# Patient Record
Sex: FEMALE | Race: WHITE | URBAN - METROPOLITAN AREA
[De-identification: names, ages, dates, MRNs, and addresses within clinical notes are randomized per-mention and may not be internally consistent; named-entity substitution may affect disease eponyms.]

---

## 2017-01-13 ENCOUNTER — HISTORICAL (OUTPATIENT)
Dept: RADIOLOGY | Facility: HOSPITAL | Age: 47
End: 2017-01-13

## 2017-01-18 ENCOUNTER — HISTORICAL (OUTPATIENT)
Dept: RADIATION THERAPY | Facility: HOSPITAL | Age: 47
End: 2017-01-18

## 2017-02-02 ENCOUNTER — HISTORICAL (OUTPATIENT)
Dept: SPEECH THERAPY | Facility: HOSPITAL | Age: 47
End: 2017-02-02

## 2017-02-03 ENCOUNTER — HISTORICAL (OUTPATIENT)
Dept: INFUSION THERAPY | Facility: HOSPITAL | Age: 47
End: 2017-02-03

## 2017-03-01 ENCOUNTER — HISTORICAL (OUTPATIENT)
Dept: SPEECH THERAPY | Facility: HOSPITAL | Age: 47
End: 2017-03-01

## 2017-03-06 ENCOUNTER — HISTORICAL (OUTPATIENT)
Dept: RADIATION THERAPY | Facility: HOSPITAL | Age: 47
End: 2017-03-06

## 2017-03-09 ENCOUNTER — HISTORICAL (OUTPATIENT)
Dept: RADIOLOGY | Facility: HOSPITAL | Age: 47
End: 2017-03-09

## 2017-03-13 ENCOUNTER — HISTORICAL (OUTPATIENT)
Dept: RADIATION THERAPY | Facility: HOSPITAL | Age: 47
End: 2017-03-13

## 2017-03-23 ENCOUNTER — HISTORICAL (OUTPATIENT)
Dept: ENDOSCOPY | Facility: HOSPITAL | Age: 47
End: 2017-03-23

## 2017-05-15 ENCOUNTER — HISTORICAL (OUTPATIENT)
Dept: RADIATION THERAPY | Facility: HOSPITAL | Age: 47
End: 2017-05-15

## 2017-06-19 ENCOUNTER — HISTORICAL (OUTPATIENT)
Dept: RADIATION THERAPY | Facility: HOSPITAL | Age: 47
End: 2017-06-19

## 2017-07-12 ENCOUNTER — HISTORICAL (OUTPATIENT)
Dept: RADIOLOGY | Facility: HOSPITAL | Age: 47
End: 2017-07-12

## 2017-07-24 ENCOUNTER — HISTORICAL (OUTPATIENT)
Dept: ADMINISTRATIVE | Facility: HOSPITAL | Age: 47
End: 2017-07-24

## 2017-07-24 ENCOUNTER — HISTORICAL (OUTPATIENT)
Dept: RADIATION THERAPY | Facility: HOSPITAL | Age: 47
End: 2017-07-24

## 2017-07-24 LAB
ABS NEUT (OLG): 9.24 X10(3)/MCL (ref 2.1–9.2)
ALBUMIN SERPL-MCNC: 3.2 GM/DL (ref 3.4–5)
ALBUMIN/GLOB SERPL: 0.8 RATIO (ref 1.1–2)
ALP SERPL-CCNC: 86 UNIT/L (ref 38–126)
ALT SERPL-CCNC: 28 UNIT/L (ref 12–78)
AST SERPL-CCNC: 20 UNIT/L (ref 15–37)
BASOPHILS # BLD AUTO: 0 X10(3)/MCL (ref 0–0.2)
BASOPHILS NFR BLD AUTO: 0.1 %
BILIRUB SERPL-MCNC: 0.7 MG/DL (ref 0.2–1)
BILIRUBIN DIRECT+TOT PNL SERPL-MCNC: 0.1 MG/DL (ref 0–0.5)
BILIRUBIN DIRECT+TOT PNL SERPL-MCNC: 0.6 MG/DL (ref 0–0.8)
BUN SERPL-MCNC: 16 MG/DL (ref 7–18)
CALCIUM SERPL-MCNC: 10.4 MG/DL (ref 8.5–10.1)
CHLORIDE SERPL-SCNC: 100 MMOL/L (ref 98–107)
CO2 SERPL-SCNC: 28 MMOL/L (ref 21–32)
CREAT SERPL-MCNC: 0.99 MG/DL (ref 0.55–1.02)
EOSINOPHIL # BLD AUTO: 0.1 X10(3)/MCL (ref 0–0.9)
EOSINOPHIL NFR BLD AUTO: 0.5 %
ERYTHROCYTE [DISTWIDTH] IN BLOOD BY AUTOMATED COUNT: 13.6 % (ref 11.5–17)
GLOBULIN SER-MCNC: 4 GM/DL (ref 2.4–3.5)
GLUCOSE SERPL-MCNC: 123 MG/DL (ref 74–106)
HCT VFR BLD AUTO: 36.6 % (ref 37–47)
HGB BLD-MCNC: 11.8 GM/DL (ref 12–16)
LYMPHOCYTES # BLD AUTO: 0.7 X10(3)/MCL (ref 0.6–4.6)
LYMPHOCYTES NFR BLD AUTO: 6.2 %
MCH RBC QN AUTO: 33.1 PG (ref 27–31)
MCHC RBC AUTO-ENTMCNC: 32.2 GM/DL (ref 33–36)
MCV RBC AUTO: 102.8 FL (ref 80–94)
MONOCYTES # BLD AUTO: 1 X10(3)/MCL (ref 0.1–1.3)
MONOCYTES NFR BLD AUTO: 8.9 %
NEUTROPHILS # BLD AUTO: 9.2 X10(3)/MCL (ref 2.1–9.2)
NEUTROPHILS NFR BLD AUTO: 84.3 %
PLATELET # BLD AUTO: 278 X10(3)/MCL (ref 130–400)
PMV BLD AUTO: 9 FL (ref 9.4–12.4)
POTASSIUM SERPL-SCNC: 4 MMOL/L (ref 3.5–5.1)
PROT SERPL-MCNC: 7.2 GM/DL (ref 6.4–8.2)
RBC # BLD AUTO: 3.56 X10(6)/MCL (ref 4.2–5.4)
SODIUM SERPL-SCNC: 140 MMOL/L (ref 136–145)
VIT B12 SERPL-MCNC: 779 PG/ML (ref 193–986)
WBC # SPEC AUTO: 11 X10(3)/MCL (ref 4.5–11.5)

## 2017-07-27 ENCOUNTER — HISTORICAL (OUTPATIENT)
Dept: INFUSION THERAPY | Facility: HOSPITAL | Age: 47
End: 2017-07-27

## 2017-08-02 ENCOUNTER — HISTORICAL (OUTPATIENT)
Dept: INFUSION THERAPY | Facility: HOSPITAL | Age: 47
End: 2017-08-02

## 2017-08-02 LAB
ABS NEUT (OLG): 6.01 X10(3)/MCL (ref 2.1–9.2)
ALBUMIN SERPL-MCNC: 3.3 GM/DL (ref 3.4–5)
ALBUMIN/GLOB SERPL: 0.8 RATIO (ref 1.1–2)
ALP SERPL-CCNC: 107 UNIT/L (ref 38–126)
ALT SERPL-CCNC: 32 UNIT/L (ref 12–78)
AST SERPL-CCNC: 22 UNIT/L (ref 15–37)
BILIRUB SERPL-MCNC: 0.4 MG/DL (ref 0.2–1)
BILIRUBIN DIRECT+TOT PNL SERPL-MCNC: 0.1 MG/DL (ref 0–0.5)
BILIRUBIN DIRECT+TOT PNL SERPL-MCNC: 0.3 MG/DL (ref 0–0.8)
BUN SERPL-MCNC: 21 MG/DL (ref 7–18)
CALCIUM SERPL-MCNC: 9.5 MG/DL (ref 8.5–10.1)
CHLORIDE SERPL-SCNC: 99 MMOL/L (ref 98–107)
CO2 SERPL-SCNC: 30 MMOL/L (ref 21–32)
CREAT SERPL-MCNC: 0.59 MG/DL (ref 0.55–1.02)
EOSINOPHIL # BLD AUTO: 0 X10(3)/MCL (ref 0–0.9)
EOSINOPHIL NFR BLD AUTO: 0.1 %
ERYTHROCYTE [DISTWIDTH] IN BLOOD BY AUTOMATED COUNT: 13.8 % (ref 11.5–17)
GLOBULIN SER-MCNC: 3.9 GM/DL (ref 2.4–3.5)
GLUCOSE SERPL-MCNC: 123 MG/DL (ref 74–106)
HCT VFR BLD AUTO: 49.4 % (ref 37–47)
HGB BLD-MCNC: 15.9 GM/DL (ref 12–16)
LYMPHOCYTES # BLD AUTO: 0.4 X10(3)/MCL (ref 0.6–4.6)
LYMPHOCYTES NFR BLD AUTO: 5.3 %
MCH RBC QN AUTO: 32.9 PG (ref 27–31)
MCHC RBC AUTO-ENTMCNC: 32.2 GM/DL (ref 33–36)
MCV RBC AUTO: 102.3 FL (ref 80–94)
MONOCYTES # BLD AUTO: 0.5 X10(3)/MCL (ref 0.1–1.3)
MONOCYTES NFR BLD AUTO: 6.9 %
NEUTROPHILS # BLD AUTO: 6 X10(3)/MCL (ref 2.1–9.2)
NEUTROPHILS NFR BLD AUTO: 87.7 %
PLATELET # BLD AUTO: 163 X10(3)/MCL (ref 130–400)
PMV BLD AUTO: 10.5 FL (ref 9.4–12.4)
POTASSIUM SERPL-SCNC: 4.4 MMOL/L (ref 3.5–5.1)
PROT SERPL-MCNC: 7.2 GM/DL (ref 6.4–8.2)
RBC # BLD AUTO: 4.83 X10(6)/MCL (ref 4.2–5.4)
SODIUM SERPL-SCNC: 137 MMOL/L (ref 136–145)
WBC # SPEC AUTO: 6.8 X10(3)/MCL (ref 4.5–11.5)

## 2017-08-09 ENCOUNTER — HISTORICAL (OUTPATIENT)
Dept: HEMATOLOGY/ONCOLOGY | Facility: CLINIC | Age: 47
End: 2017-08-09

## 2017-08-09 LAB
ABS NEUT (OLG): 12.39 X10(3)/MCL (ref 2.1–9.2)
ALBUMIN SERPL-MCNC: 3.3 GM/DL (ref 3.4–5)
ALBUMIN/GLOB SERPL: 0.9 {RATIO}
ALP SERPL-CCNC: 108 UNIT/L (ref 38–126)
ALT SERPL-CCNC: 26 UNIT/L (ref 12–78)
AST SERPL-CCNC: 20 UNIT/L (ref 15–37)
BASOPHILS # BLD AUTO: 0 X10(3)/MCL (ref 0–0.2)
BASOPHILS NFR BLD AUTO: 0.1 %
BILIRUB SERPL-MCNC: 0.3 MG/DL (ref 0.2–1)
BILIRUBIN DIRECT+TOT PNL SERPL-MCNC: 0.1 MG/DL (ref 0–0.2)
BILIRUBIN DIRECT+TOT PNL SERPL-MCNC: 0.2 MG/DL (ref 0–0.8)
BUN SERPL-MCNC: 14 MG/DL (ref 7–18)
CALCIUM SERPL-MCNC: 10.3 MG/DL (ref 8.5–10.1)
CHLORIDE SERPL-SCNC: 97 MMOL/L (ref 98–107)
CO2 SERPL-SCNC: 35 MMOL/L (ref 21–32)
CREAT SERPL-MCNC: 0.58 MG/DL (ref 0.55–1.02)
EOSINOPHIL # BLD AUTO: 0 X10(3)/MCL (ref 0–0.9)
EOSINOPHIL NFR BLD AUTO: 0.1 %
ERYTHROCYTE [DISTWIDTH] IN BLOOD BY AUTOMATED COUNT: 14.5 % (ref 11.5–17)
GLOBULIN SER-MCNC: 3.5 GM/DL (ref 2.4–3.5)
GLUCOSE SERPL-MCNC: 95 MG/DL (ref 74–106)
HCT VFR BLD AUTO: 35.7 % (ref 37–47)
HGB BLD-MCNC: 11.4 GM/DL (ref 12–16)
LYMPHOCYTES # BLD AUTO: 0.7 X10(3)/MCL (ref 0.6–4.6)
LYMPHOCYTES NFR BLD AUTO: 5.1 %
MCH RBC QN AUTO: 33.3 PG (ref 27–31)
MCHC RBC AUTO-ENTMCNC: 31.9 GM/DL (ref 33–36)
MCV RBC AUTO: 104.4 FL (ref 80–94)
MONOCYTES # BLD AUTO: 1.2 X10(3)/MCL (ref 0.1–1.3)
MONOCYTES NFR BLD AUTO: 8.7 %
NEUTROPHILS # BLD AUTO: 12.4 X10(3)/MCL (ref 2.1–9.2)
NEUTROPHILS NFR BLD AUTO: 86 %
PLATELET # BLD AUTO: 228 X10(3)/MCL (ref 130–400)
PMV BLD AUTO: 9.7 FL (ref 9.4–12.4)
POTASSIUM SERPL-SCNC: 4 MMOL/L (ref 3.5–5.1)
PROT SERPL-MCNC: 6.8 GM/DL (ref 6.4–8.2)
RBC # BLD AUTO: 3.42 X10(6)/MCL (ref 4.2–5.4)
SODIUM SERPL-SCNC: 139 MMOL/L (ref 136–145)
WBC # SPEC AUTO: 14.4 X10(3)/MCL (ref 4.5–11.5)

## 2017-08-23 ENCOUNTER — HISTORICAL (OUTPATIENT)
Dept: RADIATION THERAPY | Facility: HOSPITAL | Age: 47
End: 2017-08-23

## 2017-08-23 ENCOUNTER — HISTORICAL (OUTPATIENT)
Dept: INFUSION THERAPY | Facility: HOSPITAL | Age: 47
End: 2017-08-23

## 2017-08-23 LAB
ABS NEUT (OLG): 14.57 X10(3)/MCL (ref 2.1–9.2)
ALBUMIN SERPL-MCNC: 3 GM/DL (ref 3.4–5)
ALP SERPL-CCNC: 79 UNIT/L (ref 38–126)
ALT SERPL-CCNC: 17 UNIT/L (ref 12–78)
ANION GAP SERPL CALC-SCNC: 13 MMOL/L
AST SERPL-CCNC: 16 UNIT/L (ref 15–37)
BASOPHILS # BLD AUTO: 0 X10(3)/MCL (ref 0–0.2)
BASOPHILS NFR BLD AUTO: 0.1 %
BILIRUB SERPL-MCNC: 0.2 MG/DL (ref 0.2–1)
BILIRUBIN DIRECT+TOT PNL SERPL-MCNC: 0.1 MG/DL (ref 0–0.5)
BILIRUBIN DIRECT+TOT PNL SERPL-MCNC: 0.1 MG/DL (ref 0–0.8)
BUN SERPL-MCNC: 12 MG/DL (ref 7–18)
CHLORIDE SERPL-SCNC: 93 MMOL/L (ref 98–109)
CREAT SERPL-MCNC: 0.7 MG/DL (ref 0.6–1.3)
CROSSMATCH INTERPRETATION: NORMAL
EOSINOPHIL # BLD AUTO: 0.1 X10(3)/MCL (ref 0–0.9)
EOSINOPHIL NFR BLD AUTO: 0.5 %
ERYTHROCYTE [DISTWIDTH] IN BLOOD BY AUTOMATED COUNT: 15 % (ref 11.5–17)
GLUCOSE SERPL-MCNC: 115 MG/DL (ref 70–105)
GROUP & RH: NORMAL
HCT VFR BLD AUTO: 27.2 % (ref 37–47)
HCT VFR BLD CALC: 25 % (ref 38–51)
HGB BLD-MCNC: 8.5 MG/DL (ref 12–17)
HGB BLD-MCNC: 8.7 GM/DL (ref 12–16)
LIVER PROFILE INTERP: ABNORMAL
LYMPHOCYTES # BLD AUTO: 0.8 X10(3)/MCL (ref 0.6–4.6)
LYMPHOCYTES NFR BLD AUTO: 4.9 %
MCH RBC QN AUTO: 33.6 PG (ref 27–31)
MCHC RBC AUTO-ENTMCNC: 32 GM/DL (ref 33–36)
MCV RBC AUTO: 105 FL (ref 80–94)
MONOCYTES # BLD AUTO: 1.4 X10(3)/MCL (ref 0.1–1.3)
MONOCYTES NFR BLD AUTO: 8.5 %
NEUTROPHILS # BLD AUTO: 14.6 X10(3)/MCL (ref 2.1–9.2)
NEUTROPHILS NFR BLD AUTO: 86 %
PLATELET # BLD AUTO: 319 X10(3)/MCL (ref 130–400)
PMV BLD AUTO: 8.9 FL (ref 9.4–12.4)
POC IONIZED CALCIUM: 1.38 MMOL/L (ref 1.12–1.32)
POC TCO2: 32 MMOL/L (ref 22–27)
POTASSIUM BLD-SCNC: 3.5 MMOL/L (ref 3.5–4.9)
PRODUCT READY: NORMAL
PROT SERPL-MCNC: 6.7 GM/DL (ref 6.4–8.2)
RBC # BLD AUTO: 2.59 X10(6)/MCL (ref 4.2–5.4)
SODIUM BLD-SCNC: 134 MMOL/L (ref 138–146)
TRANSFUSION ORDER: NORMAL
WBC # SPEC AUTO: 16.9 X10(3)/MCL (ref 4.5–11.5)

## 2017-08-24 ENCOUNTER — HISTORICAL (OUTPATIENT)
Dept: RADIATION THERAPY | Facility: HOSPITAL | Age: 47
End: 2017-08-24

## 2017-08-25 ENCOUNTER — HISTORICAL (OUTPATIENT)
Dept: RADIATION THERAPY | Facility: HOSPITAL | Age: 47
End: 2017-08-25

## 2017-08-28 ENCOUNTER — HISTORICAL (OUTPATIENT)
Dept: RADIATION THERAPY | Facility: HOSPITAL | Age: 47
End: 2017-08-28

## 2017-08-29 ENCOUNTER — HISTORICAL (OUTPATIENT)
Dept: RADIATION THERAPY | Facility: HOSPITAL | Age: 47
End: 2017-08-29

## 2017-08-30 ENCOUNTER — HISTORICAL (OUTPATIENT)
Dept: RADIATION THERAPY | Facility: HOSPITAL | Age: 47
End: 2017-08-30

## 2017-08-31 ENCOUNTER — HISTORICAL (OUTPATIENT)
Dept: RADIATION THERAPY | Facility: HOSPITAL | Age: 47
End: 2017-08-31

## 2017-09-01 ENCOUNTER — HISTORICAL (OUTPATIENT)
Dept: RADIATION THERAPY | Facility: HOSPITAL | Age: 47
End: 2017-09-01

## 2017-09-05 ENCOUNTER — HISTORICAL (OUTPATIENT)
Dept: RADIATION THERAPY | Facility: HOSPITAL | Age: 47
End: 2017-09-05

## 2017-09-06 ENCOUNTER — HISTORICAL (OUTPATIENT)
Dept: RADIATION THERAPY | Facility: HOSPITAL | Age: 47
End: 2017-09-06

## 2017-09-07 ENCOUNTER — HISTORICAL (OUTPATIENT)
Dept: RADIATION THERAPY | Facility: HOSPITAL | Age: 47
End: 2017-09-07

## 2017-09-08 ENCOUNTER — HISTORICAL (OUTPATIENT)
Dept: RADIATION THERAPY | Facility: HOSPITAL | Age: 47
End: 2017-09-08

## 2017-09-11 ENCOUNTER — HISTORICAL (OUTPATIENT)
Dept: ADMINISTRATIVE | Facility: HOSPITAL | Age: 47
End: 2017-09-11

## 2017-09-11 ENCOUNTER — HISTORICAL (OUTPATIENT)
Dept: RADIATION THERAPY | Facility: HOSPITAL | Age: 47
End: 2017-09-11

## 2017-09-11 LAB
ABS NEUT (OLG): 8.24 X10(3)/MCL (ref 2.1–9.2)
ALBUMIN SERPL-MCNC: 2.7 GM/DL (ref 3.4–5)
ALBUMIN/GLOB SERPL: 0.7 RATIO (ref 1.1–2)
ALP SERPL-CCNC: 69 UNIT/L (ref 38–126)
ALT SERPL-CCNC: 31 UNIT/L (ref 12–78)
AST SERPL-CCNC: 17 UNIT/L (ref 15–37)
BASOPHILS # BLD AUTO: 0 X10(3)/MCL (ref 0–0.2)
BASOPHILS NFR BLD AUTO: 0.1 %
BILIRUB SERPL-MCNC: 0.3 MG/DL (ref 0.2–1)
BILIRUBIN DIRECT+TOT PNL SERPL-MCNC: 0.1 MG/DL (ref 0–0.5)
BILIRUBIN DIRECT+TOT PNL SERPL-MCNC: 0.2 MG/DL (ref 0–0.8)
BUN SERPL-MCNC: 16 MG/DL (ref 7–18)
CALCIUM SERPL-MCNC: 8.9 MG/DL (ref 8.5–10.1)
CHLORIDE SERPL-SCNC: 97 MMOL/L (ref 98–107)
CO2 SERPL-SCNC: 31 MMOL/L (ref 21–32)
CREAT SERPL-MCNC: 0.56 MG/DL (ref 0.55–1.02)
EOSINOPHIL # BLD AUTO: 0 X10(3)/MCL (ref 0–0.9)
EOSINOPHIL NFR BLD AUTO: 0.3 %
ERYTHROCYTE [DISTWIDTH] IN BLOOD BY AUTOMATED COUNT: 15 % (ref 11.5–17)
GLOBULIN SER-MCNC: 4.1 GM/DL (ref 2.4–3.5)
GLUCOSE SERPL-MCNC: 105 MG/DL (ref 74–106)
HCT VFR BLD AUTO: 29.1 % (ref 37–47)
HGB BLD-MCNC: 8.9 GM/DL (ref 12–16)
LYMPHOCYTES # BLD AUTO: 0.7 X10(3)/MCL (ref 0.6–4.6)
LYMPHOCYTES NFR BLD AUTO: 6.9 %
MCH RBC QN AUTO: 32.2 PG (ref 27–31)
MCHC RBC AUTO-ENTMCNC: 30.6 GM/DL (ref 33–36)
MCV RBC AUTO: 105.4 FL (ref 80–94)
MONOCYTES # BLD AUTO: 1 X10(3)/MCL (ref 0.1–1.3)
MONOCYTES NFR BLD AUTO: 9.6 %
NEUTROPHILS # BLD AUTO: 8.2 X10(3)/MCL (ref 2.1–9.2)
NEUTROPHILS NFR BLD AUTO: 83.1 %
PLATELET # BLD AUTO: 385 X10(3)/MCL (ref 130–400)
PMV BLD AUTO: 9 FL (ref 9.4–12.4)
POTASSIUM SERPL-SCNC: 4.4 MMOL/L (ref 3.5–5.1)
PROT SERPL-MCNC: 6.8 GM/DL (ref 6.4–8.2)
RBC # BLD AUTO: 2.76 X10(6)/MCL (ref 4.2–5.4)
SODIUM SERPL-SCNC: 137 MMOL/L (ref 136–145)
WBC # SPEC AUTO: 9.9 X10(3)/MCL (ref 4.5–11.5)

## 2022-04-30 NOTE — PROGRESS NOTES
Patient:   Salome Malone            MRN: [id number]            FIN: [id number]               Age:   47 years     Sex:  Female     :  1970   Associated Diagnoses:   Malignant neoplasm of overlapping sites of tongue; Malnutrition of moderate degree; Major depressive disorder, single episode, moderate; Anemia; Dehydration   Author:   Donnie Naylor      Visit Information      ENT: Dr. Baljit Bhat  Radiation Oncologist: Dr. Sheriff Prellop    Recurrence--Bilateral neck 2017  Recurrent Tongue Cancer s/p salvage surgery--17  Tongue Cancer stage LEONEL([id number]) diagnosed 16  Biopsy/surgery/pathology:   1. Tongue mass biopsy done 16--invasive, keratinizing squamous cell carcinoma, well-differentiated. p16 negative.  2. Salvage total glossectomy, partial palatectomy, neck dissection, right thigh musculocutaneous free flap done 17--0.2cm focus on lateral border of tongue residual disease squamous cell carcinoma, margins clear, treatment effect present, lympho-vascular invasion not identified, perineural invasion present, 38 lymph nodes involved with focus 1.5cm, extranodal extension present. (T1N1)  3. Right neck mass FNA/core needle biopsy done 17--positive for metastatic squamous cell carcinoma.  Imagin. CT neck w/ contrast 16--large mass occupying right half of tongue measures 6.2X2.4X2.3cm with metastatic adenopathy in neck measuring up to 1.7cm  2. PET/CT 16--right half of tongue muscles c/w biopsy proven tongue carcinoma, involves 4.6cm anterior posterior diameter and 3.3cm of width with SUV 11.18, tongue mass crosses the midline. bilateral parapharyngeal spaces/submandibular hypermetabolic enlarged lymph nodes, left aspect measures 2.5X2.0cm with SUV 11.42 and right measures 1.8X1.4cm with SUV 8.5; No other sytemic disease.  3. PET/CT 17--Significant interval improvement in disease with minial residual activity along right aspect of  tongue, may be post-therapeutic, no hypermetabolic enlarged lymph nodes currently identified, mildly asymmetric activity along right true vocal cord with is minimally medially deviated compared to left, may relate to post-therapeutic change.  4. CT soft tissue neck 3/9/17--right tongue heterogeneity persists but improved, difficult to discretely measure, right level 2A necrotic adenopathy has increased in size, otherwise no jugulodigastric adenopathy.  5. PET/CT 7/12/17--Interval development of hypermetabolic right cervical matted adenopathy measures 5.5X3cm with SUV 28.9, extending to tracheostomy site and up to the level of the hyoid bone.    Procedures:  PEG placed by Dr. Bin Abreu 6/9/16--removed 1/20/17.  Complete dental extractions done 6/16/16.  Right IJ mediport placed by Dr. Sam 6/22/16.  PEG replaced Dr. Bin Abreu on 3/23/17.    Treatment history:    1. Induction chemothearpy TPF X 3 cycles 6/27/16--8/9/16   2. Weekly Cisplatin with XRT 8/29/16--10/17/16 (received only 4 weekly doses of Cisplatin due to thrombocytopenia).   3. Salvage surgery Dr. Bhat 4/5/17.    Treatment plan:    1. Palliative Carboplatin/Paclitaxel every 3 weeks. Started on 7/27/17.    Due for Cycle # 2 on 8/17/17.       Visit type:  Scheduled follow-up.    Accompanied by:  Spouse.       Chief Complaint   8/2/2017 10:20 CDT       No c/o        Interval History   Current complaint: Patient presents for follow-up. She started her 1st cycle of palliative Carbo/Taxol last week.  is at bedside. Reports that her neck wounds are not any worse since starting treatment but they are not better either. She tolerated chemotherapy fairly well with exception of nausea and loose stools. No vomiting and no BMs so far today. He gave her a dose of Imodium yesterday and this appears to have helped. He states she had some aches an pains over the weekend from Neulasta but this has also resolved. Continues to tolerate her tube feedings  without any problems. Noted 1 lb weight loss in the past week. Hospice came and spoke with the patient earlier this week and the patient admits she is not ready yet for Hospice. She wants to do 2 more cycles of chemotherapy before making decision. Her  mentions that her central neck wound continues to be packed daily and this causes irration and bleeding when he has to change the dressing. No difficulty removing or inserting her trach at this time but he is concerned that he will have problems in the future. Patient appears dehydrated today so will plan to give IV fluids today and have her HH give on Friday. No other problems reported.      Review of Systems   Constitutional:  Fatigue, weight stable, No fever, No chills, No decreased activity, No loss of appetite.    Eye:  No recent visual problem.    Ear/Nose/Mouth/Throat:  +neck swelling, No decreased hearing, No nasal congestion.    Respiratory:  No shortness of breath, No cough, No wheezing.    Cardiovascular:  No chest pain, No palpitations, No peripheral edema.    Gastrointestinal:  Nausea, Diarrhea, +NPO, all support with PEG, Diarrhea x 2 days. Resolved with Imodium, No vomiting, No constipation, No heartburn, No abdominal pain.    Hematology/Lymphatics:  No bruising tendency, No bleeding tendency.    Immunologic   Musculoskeletal:  No back pain, No joint pain.    Integumentary:  +wounds along neck recurrence of her cancer, No rash, No skin lesion.    Neurologic:  Alert and oriented X4, No confusion, No headache.    Psychiatric:  Depression (better), No anxiety.    All other systems are negative      Health Status   Allergies:    Allergic Reactions (Selected)  No Known Medication Allergies   Current medications:  (Selected)   Documented Medications  Documented  Hydrocodon-Acetamin 7.5-325/15: 15 mL, Oral, q6hr  Tylenol Childrens 160 mg/5 mL oral suspension: 640 mg = 20 mL, Oral, q4hr, PRN PRN pain, mild, 0 Refill(s)  Vitamin B Complex oral liquid: 10  mL, PEG Tube, Daily, 0 Refill(s)  fluoxetine 20 mg oral capsule: 20 mg = 1 cap(s), Oral, Daily, # 30 cap(s), 0 Refill(s)  glutamine oral powder for reconstitution: 20 gms, PEG Tube, BID, 0 Refill(s)  loratadine 5 mg/5 mL oral syrup: See Instructions, 10 mL per PEG Daily for 5 days after chemotherapy, 0 Refill(s)  ondansetron 4 mg/5 mL oral solution: See Instructions, 10 mL per PEG TID for 3 days after chemotherapy and every 8hrs PRN nausea, # 200 mL, 6 Refill(s)      Histories   Past Medical History:    No active or resolved past medical history items have been selected or recorded.   Family History:    Father  Alzheimer's disease  Mother  Pacemaker...  Sister    History is negative.     Procedure history:    PEG Tube Insertion Initial on 3/23/2017 at 46 Years.  Comments:  3/23/2017 14:27 - Hamlet Mejia RN  auto-populated from documented surgical case  Catheter Insertion Mediport (None) on 2016 at 46 Years.  Comments:  2016 15:18 - Nicole Pacheco  auto-populated from documented surgical case  PEG Tube Insertion Initial on 2016 at 46 Years.  Comments:  6/10/2016 08:47 - Dominic DENNIS, Maru DIAZ  auto-populated from documented surgical case   delivery only; ([id number]).  teeth extractions.   Social History        Alcohol  Comment: Denies alcohol use.    Employment/School  Description:   Comment: last worked 3 weeks ago    Home/Environment  Lives with: Spouse    Nutrition/Health  Uses alternative healthcare: peg tube    Substance Abuse   Use: Never    Tobacco  Use: Former smoker  Type: Cigarettes  Type: Cigarettes  Comment: Quit in 2016.        Physical Examination   Vital Signs   2017 10:20 CDT       Temperature Oral          36.8 DegC                             Peripheral Pulse Rate     125 bpm  HI                             SpO2                      98 %                             Systolic Blood Pressure   105 mmHg                             Diastolic Blood Pressure  79  mmHg     General:  Alert and oriented, No acute distress, thin, chronically ill-appearing white female.    Eye:  Pupils are equal, round and reactive to light, Vision unchanged.    HENT:  Normocephalic, Normal hearing, s/p total glossectomy.    Neck:  Supple, No jugular venous distention, No thyromegaly, +massive necrotic adenopathy R>L surrounding tracheostomy with few places with open wounds, mild serous/bloody drainage on gauze. Packing in place to wound central wound with bloody drainage.    Respiratory:  Lungs are clear to auscultation, Respirations are non-labored, Breath sounds are equal.    Cardiovascular:  Normal rate, Regular rhythm, No murmur, No gallop, Normal peripheral perfusion, No edema.    Gastrointestinal:  Soft, Non-tender, Non-distended, Normal bowel sounds, No organomegaly, PEG in place on left--clean/dry.    Lymphatics:  No lymphadenopathy neck, axilla, groin.    Musculoskeletal:  Normal range of motion, Normal strength, No deformity, Normal gait.    Integumentary:  Warm, Dry, Intact.    Neurologic:  Alert, Oriented, Normal sensory, Normal motor function, Normal gait.    Psychiatric:  Cooperative, Appropriate mood & affect.    Cognition and Speech:  Oriented, Speech clear and coherent.    ECOG Performance Scale: 1- Strenuous physical activity restricted; fully ambulatory and able to carry out light work.      Review / Management   Results review:  Lab results   8/2/2017 10:14 CDT       WBC                       6.8 x10(3)/mcL                             RBC                       4.83 x10(6)/mcL                             Hgb                       15.9 gm/dL                             Hct                       49.4 %  HI                             Platelet                  163 x10(3)/mcL                             MCV                       102.3 fL  HI                             MCH                       32.9 pg  HI                             MCHC                      32.2 gm/dL  LOW                              RDW                       13.8 %                             MPV                       10.5 fL                             Abs Neut                  6.01 x10(3)/mcL                             NEUT%                     87.7 %  NA                             NEUT#                     6.0 x10(3)/mcL                             LYMPH%                    5.3 %  NA                             LYMPH#                    0.4 x10(3)/mcL  LOW                             MONO%                     6.9 %  NA                             MONO#                     0.5 x10(3)/mcL                             EOS%                      0.1 %  NA                             EOS#                      0.0 x10(3)/mcL  .       Impression and Plan   Diagnosis     Malignant neoplasm of overlapping sites of tongue (RLZ09-NF C02.8).     Malnutrition of moderate degree (ECE67-LB E44.0).     Major depressive disorder, single episode, moderate (BFG36-YE F32.1).     Anemia (FEN51-PR D64.9).     Dehydration (RSD91-VW E86.0).     Plan:  Patient with advanced head and neck cancer, stage LEONEL tongue cancer extending to base of tongue with bilateral neck adenopathy, diagnosed 5/23/16.  Patient received induction chemotherapy X 3 cycles followed by definitive chemotherapy and radiation completed 10/17/16.  PET from 1/13/17 showed resolution of hypermetabolic neck adenopathy, some mild residual activity right tongue and mild activity vocal cords which direct visualization is needed.  Patient developed recurrence in neck and tongue and underwent total glossectomy/bilateral neck dissection 4/5/17 which revealed residual disease T1N1 with extranodal extension.  She did well until 7/2017 when she developed increasing neck swelling thought to be abscess/infection but needle biopsy now confirms recurrent disease.  PET shows disease limited to neck/paratracheal.    Patient is not a candidate for triplet therapy with cisplatin/5FU/Erbitux due  to her overall poor condition.  Dr. Welch chose to treat her with palliative Carboplatin and paclitaxel.Patient and her  understand that her cancer is very aggressive and may not respond to treatment. They also understand that treatment is only palliative and that she could be at risk for worsening of neck wounds associated with her cancer.   Patient started her 1st cycle on 7/27/17. Tolerated fairly well.   Labs today show resolved anemia but I feel this is due to dehydration. WBC normal. CMP is pending. Monitoring calcium level closely. If increasing, will add Zometa.   Plan to give 1 liter of Normal saline today.   Will contact her HH - [facility] and schedule additional IV fluids on 8/4/17.   Labs only next week.   RTC for labs and treatment only - Cycle # 2 on 8/17/17. Neulasta patch ordered.   Follow-up in 3 weeks with labs.   Weight stable today. Tolerating tube feedings.   Continue with Imodium as needed for loose stools.   Continue with current wound care to neck wounds. Instructed patient's  to take a picture of wound today so we can have something to compare to when she comes for her next appointment.   Hospice evaluated patient earlier this week but she is not quite ready to stop chemotherapy treatments.   All questions answered.     Patient was told to contact me with any problems before return to clinic.      ANKITA Michael

## 2022-04-30 NOTE — PROGRESS NOTES
Patient:   Salome Malone            MRN: [id number]            FIN: [id number]               Age:   47 years     Sex:  Female     :  1970   Associated Diagnoses:   Malignant neoplasm of overlapping sites of tongue; Malnutrition of moderate degree; Major depressive disorder, single episode, moderate; Anemia   Author:   Donnie Naylor      Visit Information      ENT: Dr. Baljit Bhat  Radiation Oncologist: Dr. Sheriff Prellop    Recurrence--Bilateral neck 2017  Recurrent Tongue Cancer s/p salvage surgery--17  Tongue Cancer stage LEONEL([id number]) diagnosed 16  Biopsy/surgery/pathology:   1. Tongue mass biopsy done 16--invasive, keratinizing squamous cell carcinoma, well-differentiated. p16 negative.  2. Salvage total glossectomy, partial palatectomy, neck dissection, right thigh musculocutaneous free flap done 17--0.2cm focus on lateral border of tongue residual disease squamous cell carcinoma, margins clear, treatment effect present, lympho-vascular invasion not identified, perineural invasion present,  lymph nodes involved with focus 1.5cm, extranodal extension present. (T1N1)  3. Right neck mass FNA/core needle biopsy done 17--positive for metastatic squamous cell carcinoma.  Imagin. CT neck w/ contrast 16--large mass occupying right half of tongue measures 6.2X2.4X2.3cm with metastatic adenopathy in neck measuring up to 1.7cm  2. PET/CT 16--right half of tongue muscles c/w biopsy proven tongue carcinoma, involves 4.6cm anterior posterior diameter and 3.3cm of width with SUV 11.18, tongue mass crosses the midline. bilateral parapharyngeal spaces/submandibular hypermetabolic enlarged lymph nodes, left aspect measures 2.5X2.0cm with SUV 11.42 and right measures 1.8X1.4cm with SUV 8.5; No other sytemic disease.  3. PET/CT 17--Significant interval improvement in disease with minial residual activity along right aspect of tongue, may be  post-therapeutic, no hypermetabolic enlarged lymph nodes currently identified, mildly asymmetric activity along right true vocal cord with is minimally medially deviated compared to left, may relate to post-therapeutic change.  4. CT soft tissue neck 3/9/17--right tongue heterogeneity persists but improved, difficult to discretely measure, right level 2A necrotic adenopathy has increased in size, otherwise no jugulodigastric adenopathy.  5. PET/CT 7/12/17--Interval development of hypermetabolic right cervical matted adenopathy measures 5.5X3cm with SUV 28.9, extending to tracheostomy site and up to the level of the hyoid bone.  6. CTA of neck on 8/14/17--Large necrotic right-sided neck mass consistent with malignancy. No contrast extravasation or active bleeding is identified. There is no vascular occlusion or stenosis.    Procedures:  PEG placed by Dr. Bin Abreu 6/9/16--removed 1/20/17.  Complete dental extractions done 6/16/16.  Right IJ mediport placed by Dr. Sam 6/22/16.  PEG replaced Dr. Bin Abreu on 3/23/17.    Treatment history:    1. Induction chemothearpy TPF X 3 cycles 6/27/16--8/9/16   2. Weekly Cisplatin with XRT 8/29/16--10/17/16 (received only 4 weekly doses of Cisplatin due to thrombocytopenia).   3. Salvage surgery Dr. Bhat 4/5/17.   4. Palliative Carboplatin/Paclitaxel x 1 cycle on 7/27/17. Stopped due to clinical progression.     Treatment plan:     1. Palliatve radiation to tumor to control bleeding. Scheduled to start on 8/25/17.    2. Keytruda every 3 weeks to start once radiation completed.      Visit type:  Scheduled follow-up.    Accompanied by:  Spouse.       Chief Complaint   8/23/2017 10:32 CDT      bleeding to trach site        Interval History   Current complaint: Patient presents for scheduled hospital follow-up.  is present. He states that the patient has been having daily bleeding from her tumor at her trach site. They were seen an evaluated by Dr. Carvalho earlier  this morning who plans on doing palliative radiation to help control the bleeding. The plan is to start either this Friday or Monday. She also has an appointment with Dr. Bhat tomorrow in [place]. Besides her bleeding issues, she is doing fairly well. Continues to tolerate tube feedings and her weight is stable. Bowels are moving well. Denies any fevers. She is coughing some but no change in her secretions. Dr. Welch has discussed starting Keytruda after completing radiation and she is excited about this. No other problems reported.      Review of Systems   Constitutional:  Weight stable, No fever, No chills, No fatigue, No decreased activity, No loss of appetite.    Eye:  No recent visual problem.    Ear/Nose/Mouth/Throat:  +neck swelling, Oozing from tumor , No decreased hearing, No nasal congestion.    Respiratory:  No shortness of breath, No cough, No wheezing.    Cardiovascular:  No chest pain, No palpitations, No peripheral edema.    Gastrointestinal:  +NPO, all support with PEG, No nausea, No vomiting, No diarrhea, No constipation, No heartburn, No abdominal pain.    Hematology/Lymphatics:  No bruising tendency, No bleeding tendency.    Immunologic   Musculoskeletal:  No back pain, No joint pain.    Integumentary:  +wounds along neck recurrence of her cancer, No rash, No skin lesion.    Neurologic:  Alert and oriented X4, No confusion, No headache.    Psychiatric:  Depression, No anxiety.    All other systems are negative      Health Status   Allergies:    Allergic Reactions (Selected)  No Known Medication Allergies   Current medications:  (Selected)   Documented Medications  Documented  Hydrocodon-Acetamin 7.5-325/15: 15 mL, Oral, q6hr  LORazepam 0.5 mg oral tablet: 0.5 mg = 1 tab(s), Oral, Once a day (at bedtime), 0 Refill(s)  Tylenol Childrens 160 mg/5 mL oral suspension: 640 mg = 20 mL, Oral, q4hr, PRN PRN pain, mild, 0 Refill(s)  Vitamin B Complex oral liquid: 10 mL, PEG Tube, Daily, 0  Refill(s)  fluoxetine 20 mg oral capsule: 20 mg = 1 cap(s), Oral, Daily, # 30 cap(s), 0 Refill(s)  glutamine oral powder for reconstitution: 20 gms, PEG Tube, BID, 0 Refill(s)  loratadine 5 mg/5 mL oral syrup: See Instructions, 10 mL per PEG Daily for 5 days after chemotherapy, 0 Refill(s)  ondansetron 4 mg/5 mL oral solution: See Instructions, 10 mL per PEG TID for 3 days after chemotherapy and every 8hrs PRN nausea, # 200 mL, 6 Refill(s)      Histories   Past Medical History:    No active or resolved past medical history items have been selected or recorded.   Family History:    Father  Alzheimer's disease  Mother  Pacemaker...  Sister    History is negative.     Procedure history:    PEG Tube Insertion Initial on 3/23/2017 at 46 Years.  Comments:  3/23/2017 14:27 - Hamlet Mejia RN  auto-populated from documented surgical case  Catheter Insertion Mediport (None) on 2016 at 46 Years.  Comments:  2016 15:18 - Nicole Pacheco  auto-populated from documented surgical case  PEG Tube Insertion Initial on 2016 at 46 Years.  Comments:  6/10/2016 08:47 - Dominic DENNIS, Maru DIAZ  auto-populated from documented surgical case   delivery only; (23410).  teeth extractions.   Social History      Physical Examination   Vital Signs   2017 10:32 CDT      Temperature Axillary      36.8 DegC  HI                             Peripheral Pulse Rate     120 bpm  HI                             SpO2                      98 %                             Systolic Blood Pressure   100 mmHg                             Diastolic Blood Pressure  70 mmHg     General:  Alert and oriented, No acute distress, thin, chronically ill-appearing white female.    Eye:  Pupils are equal, round and reactive to light, Vision unchanged.    HENT:  Normocephalic, Normal hearing, s/p total glossectomy, Does not speak.    Neck:  No jugular venous distention, Hyperpigmentation noted to entire neck. Significant disease progression with  tumor lesions now eroding through skin midline and right cervical region. Now with area of necrosis near tracheostomy with oozing of blood present.    Respiratory:  Respirations are non-labored, Breath sounds are equal, Tracheostomy deviated to the left side due to tumor burden.         Breath sounds: Bilateral, Lower lobe, Base, Diminished.    Cardiovascular:  120  beats per minute, Regular rhythm, Tachycardia, Normal peripheral perfusion, No edema.    Gastrointestinal:  Soft, Non-tender, Non-distended, Normal bowel sounds, No organomegaly, PEG in place on left--clean/dry.    Musculoskeletal:  Normal range of motion, Normal strength, No deformity, Normal gait.    Integumentary:  Warm, Dry, Intact.    Neurologic:  Alert, Oriented, Normal sensory, Normal motor function, Normal gait, No sensation noted to neck area from previous radiation.    Psychiatric:  Cooperative, Appropriate mood & affect.    Cognition and Speech:  Oriented.    ECOG Performance Scale: 2 - Capable of all self-care but unable to carry out any work activities. Up and about greater than 50 percent of waking hours.      Review / Management   Results review:  Lab results   8/23/2017 10:48 CDT      POC Sodium                134 mmol/L  LOW                             POC Potassium             3.5 mmol/L                             POC Chloride              93 mmol/L  LOW                             POC Ion Calcium           1.38 mmol/L  HI                             POC Glucose               115 mg/dL  HI                             POC BUN                   12.0 mg/dL                             POC Creatinine            0.7 mg/dL                             POC AGAP                  13.0  NA                             POC Hb                    8.5 mg/dL  LOW                             POC Hct                   25.0 %  LOW                             POC TCO2                  32.0 mmol/L  HI    8/23/2017 10:37 CDT      WBC                        16.9 x10(3)/mcL  HI                             RBC                       2.59 x10(6)/mcL  LOW                             Hgb                       8.7 gm/dL  LOW                             Hct                       27.2 %  LOW                             Platelet                  319 x10(3)/mcL                             MCV                       105.0 fL  HI                             MCH                       33.6 pg  HI                             MCHC                      32.0 gm/dL  LOW                             RDW                       15.0 %                             MPV                       8.9 fL  LOW                             Abs Neut                  14.57 x10(3)/mcL  HI                             NEUT%                     86.0 %  NA                             NEUT#                     14.6 x10(3)/mcL  HI                             LYMPH%                    4.9 %  NA                             LYMPH#                    0.8 x10(3)/mcL                             MONO%                     8.5 %  NA                             MONO#                     1.4 x10(3)/mcL  HI                             EOS%                      0.5 %  NA                             EOS#                      0.1 x10(3)/mcL                             BASO%                     0.1 %  NA                             BASO#                     0.0 x10(3)/mcL    8/23/2017 10:37 CDT      ABO/Rh                    A POS                             Antibody Screen           Negative                             Crossmatch                Compatible                             Product Ready             1LPB READY  .       Impression and Plan   Diagnosis     Malignant neoplasm of overlapping sites of tongue (YFD72-MM C02.8).     Malnutrition of moderate degree (STX68-AZ E44.0).     Major depressive disorder, single episode, moderate (PWT02-MX F32.1).     Anemia (LQJ37-OX D64.9).     Plan    Patient with advanced head and neck  cancer, stage LEONEL tongue cancer extending to base of tongue with bilateral neck adenopathy, diagnosed 5/23/16.  Patient received induction chemotherapy X 3 cycles followed by definitive chemotherapy and radiation completed 10/17/16.  PET from 1/13/17 showed resolution of hypermetabolic neck adenopathy, some mild residual activity right tongue and mild activity vocal cords which direct visualization is needed.  Patient developed recurrence in neck and tongue and underwent total glossectomy/bilateral neck dissection 4/5/17 which revealed residual disease T1N1 with extranodal extension.  She did well until 7/2017 when she developed increasing neck swelling thought to be abscess/infection but needle biopsy now confirms recurrent disease.  PET shows disease limited to neck/paratracheal.  Patient is not a candidate for triplet therapy with cisplatin/5FU/Erbitux due to her overall poor condition.    Dr. Welch chose to treat her with palliative Carboplatin and paclitaxel. Started completed her 1st cycle on 7/27/17.   Patient presented to [facility] ED on 8/13/17 with bleeding from tumor site near tracheostomy.   ENT consulted and CTA of neck revealed a large necrotic right-sided neck mass consistent with malignancy. No contrast extravasation or active bleeding is identified. There is no vascular occlusion or stenosis. Likely bleeding is from tumor.     Patient presents today for scheduled follow-up.   Continues with daily oozing/bleeding from her tumor at her tracheostomy site.   They were seen an evaluated by Dr. Carvalho earlier this morning and she plans on doing palliative radiation to help control the bleeding. The plan is to start either this Friday or Monday. Goal of 5-10 treatments.   She also has an appointment with Dr. Bhat tomorrow in [place].  Labs today reveal worsening anemia. Will type/crossmatch and transfuse 1 unit of PRBCs today.   Will also give 1 liter of Normal saline today for hydration.   Dr. Welch  discussed starting Keytruda once radiation has been completed and she is in agreement. We discussed the most common side effects/adverse events and they state understanding with no further questions at present. She was given a pamphlet on the medication.   Will refer patient for chemotherapy teaching class within next few weeks.   Follow-up in 2 weeks with labs.   All questions answered.        ANKITA Michael